# Patient Record
Sex: MALE | Race: WHITE | NOT HISPANIC OR LATINO | Employment: FULL TIME | URBAN - METROPOLITAN AREA
[De-identification: names, ages, dates, MRNs, and addresses within clinical notes are randomized per-mention and may not be internally consistent; named-entity substitution may affect disease eponyms.]

---

## 2021-04-05 ENCOUNTER — HOSPITAL ENCOUNTER (OUTPATIENT)
Dept: ULTRASOUND IMAGING | Facility: HOSPITAL | Age: 21
Discharge: HOME | End: 2021-04-05
Admitting: FAMILY MEDICINE

## 2021-04-05 DIAGNOSIS — N50.82 SCROTAL PAIN: ICD-10-CM

## 2021-04-05 PROCEDURE — 76870 US EXAM SCROTUM: CPT

## 2021-04-05 PROCEDURE — 93976 VASCULAR STUDY: CPT

## 2021-04-05 NOTE — PROGRESS NOTES
Results reviewed.  Please call patient with following message:    No hernia is noted.  No infection of the epididymis is seen, but this remains possible.  Start antibiotic.  If not improving over 24-48 hrs then refer to general surgery for further evaluation of hernia.      Other causes of symptoms are possible, such as kidney stone, STD, UTI, appendicitis, or other more serious conditions.  Follow-up is very important.  I can re-evaluate on Wednesday here at  if needed.  If there is any worsening then go to ER for further evaluation.

## 2021-04-08 ENCOUNTER — TELEPHONE (OUTPATIENT)
Dept: URGENT CARE | Facility: CLINIC | Age: 21
End: 2021-04-08

## 2021-04-08 NOTE — TELEPHONE ENCOUNTER
Please contact patient to check on his symptoms.  If symptoms are persistent, he may need referral to urology or to a general surgeon.

## 2021-04-09 ENCOUNTER — TELEPHONE (OUTPATIENT)
Dept: URGENT CARE | Facility: CLINIC | Age: 21
End: 2021-04-09

## 2021-04-09 DIAGNOSIS — N50.82 SCROTAL PAIN: Primary | ICD-10-CM

## 2021-04-09 NOTE — TELEPHONE ENCOUNTER
I SPOKE TO THE PATIENT AND HE REPORTS THAT HIS SYMPTOMS HAVE NOT CHANGED. NO BETTER OR WORSE. PLEASE ADVISE

## 2021-04-26 ENCOUNTER — OFFICE VISIT (OUTPATIENT)
Dept: SURGERY | Facility: CLINIC | Age: 21
End: 2021-04-26

## 2021-04-26 VITALS
WEIGHT: 263.2 LBS | TEMPERATURE: 98.2 F | BODY MASS INDEX: 32.73 KG/M2 | RESPIRATION RATE: 16 BRPM | OXYGEN SATURATION: 97 % | HEIGHT: 75 IN | HEART RATE: 79 BPM | DIASTOLIC BLOOD PRESSURE: 87 MMHG | SYSTOLIC BLOOD PRESSURE: 147 MMHG

## 2021-04-26 DIAGNOSIS — R10.31 INGUINODYNIA, RIGHT: Primary | ICD-10-CM

## 2021-04-26 PROCEDURE — 99204 OFFICE O/P NEW MOD 45 MIN: CPT | Performed by: SURGERY

## 2021-04-26 RX ORDER — IBUPROFEN 800 MG/1
800 TABLET ORAL EVERY 8 HOURS
Qty: 30 TABLET | Refills: 0 | Status: SHIPPED | OUTPATIENT
Start: 2021-04-26 | End: 2021-05-06

## 2021-04-26 NOTE — PROGRESS NOTES
GENERAL SURGERY CONSULTATION NOTE    Consult requested by: [facility] urgent care    Patient Care Team:  Provider, No Known as PCP - General    Reason for consult: Right inguinodynia    Subjective     Patient is a 20 y.o. male presents with right inguinodynia which started approximately 1 month ago.  The patient reports that he picked up a heavy bucket about 1 month ago, and that evening, he noticed severe pain in his right groin which radiated down into the right testicle.  He has not noticed any bulge in that area.  His pain is made worse with walking, but he denies any problems with urinating, erectile dysfunction, ejaculation, or having bowel movements.  He did have a ultrasound performed of the right scrotum and testicles which demonstrated no evidence of right inguinal hernia.  Normal sonographic appearance of the testicles bilaterally.  Trace bilateral scrotal hydroceles.  1 cm left epididymal head cyst or spermatocele.  This ultrasound was done on 4/5/2021.  He has not taken any medicines, attempted any other therapies other than rest.     Review of Systems   Constitutional: Negative for appetite change, chills and fever.   HENT: Negative for congestion and sore throat.    Respiratory: Negative for cough and shortness of breath.    Cardiovascular: Negative for chest pain and palpitations.   Gastrointestinal: Negative for abdominal pain, constipation, diarrhea, nausea, vomiting and GERD.   Genitourinary: Positive for testicular pain. Negative for decreased urine volume, difficulty urinating, dysuria, frequency and scrotal swelling.   Musculoskeletal: Negative for arthralgias and back pain.   Skin: Negative for rash and skin lesions.   Neurological: Negative for dizziness, seizures and memory problem.   Hematological: Negative for adenopathy. Does not bruise/bleed easily.   Psychiatric/Behavioral: Negative for sleep disturbance and depressed mood.        History  History reviewed. No pertinent past  medical history.  History reviewed. No pertinent surgical history.  Family History   Problem Relation Age of Onset   • No Known Problems Mother    • No Known Problems Father      Social History     Tobacco Use   • Smoking status: Never Smoker   • Smokeless tobacco: Never Used   Substance Use Topics   • Alcohol use: Yes     Comment: occasional   • Drug use: Defer     (Not in a hospital admission)    Allergies:  Patient has no known allergies.    Objective     Vital Signs  Temp:  [98.2 °F (36.8 °C)] 98.2 °F (36.8 °C)  Heart Rate:  [79] 79  Resp:  [16] 16  BP: (147)/(87) 147/87    Physical Exam  Vitals reviewed.   Constitutional:       Appearance: He is well-developed.   HENT:      Head: Normocephalic and atraumatic.   Eyes:      Pupils: Pupils are equal, round, and reactive to light.   Cardiovascular:      Rate and Rhythm: Normal rate and regular rhythm.   Pulmonary:      Effort: Pulmonary effort is normal.      Breath sounds: Normal breath sounds.   Abdominal:      General: There is no distension.      Palpations: Abdomen is soft.      Tenderness: There is no abdominal tenderness.      Hernia: No hernia is present. There is no hernia in the left inguinal area or right inguinal area.   Genitourinary:     Penis: Normal and circumcised.       Testes:         Right: Tenderness present. Mass or swelling not present.         Left: Mass, tenderness or swelling not present.      Comments: No evidence of inguinal hernia bilaterally  Musculoskeletal:         General: Normal range of motion.      Cervical back: Normal range of motion.   Lymphadenopathy:      Cervical: No cervical adenopathy.   Skin:     General: Skin is warm and dry.      Findings: No rash.   Neurological:      Mental Status: He is alert and oriented to person, place, and time.         Results Review:   Lab Results (last 24 hours)     ** No results found for the last 24 hours. **        No radiology results for the last day      I reviewed the patient's new  imaging results and agree with the interpretation.  I reviewed the patient's other test results and agree with the interpretation    Assessment/Plan     Active Problems:  Right inguinodynia    Patient has right inguinodynia of unclear etiology.  What is clear, as that there was no evidence of hernia on ultrasound, and there is no evidence of hernia on today's physical examination.  In fact, the patient's inguinal floor does feel quite strong.  The patient is exquisitely tender along the right epididymis and right groin area.  Etiologies for the his right groin discomfort include epididymitis and muscle strain.  Recommend high-dose NSAIDs, I have written a prescription for 800 mg ibuprofen.  Also recommend ice, rest, and scrotal support.  Referral made to physical therapy to help with muscle strain in that area.  Patient will likely benefit from heat/TENS unit  Patient may follow-up with his primary care provider if this pain persists after high-dose NSAIDs, ice, rest, and scrotal support.    I discussed the patients findings and my recommendations with the patient.     Hebert Torres MD  04/26/21  10:51 EDT